# Patient Record
Sex: FEMALE | Race: WHITE | NOT HISPANIC OR LATINO | URBAN - METROPOLITAN AREA
[De-identification: names, ages, dates, MRNs, and addresses within clinical notes are randomized per-mention and may not be internally consistent; named-entity substitution may affect disease eponyms.]

---

## 2017-05-04 ENCOUNTER — OUTPATIENT (OUTPATIENT)
Dept: OUTPATIENT SERVICES | Facility: HOSPITAL | Age: 57
LOS: 1 days | Discharge: HOME | End: 2017-05-04

## 2017-06-28 DIAGNOSIS — Z01.419 ENCOUNTER FOR GYNECOLOGICAL EXAMINATION (GENERAL) (ROUTINE) WITHOUT ABNORMAL FINDINGS: ICD-10-CM

## 2018-12-18 ENCOUNTER — OUTPATIENT (OUTPATIENT)
Dept: OUTPATIENT SERVICES | Facility: HOSPITAL | Age: 58
LOS: 1 days | Discharge: HOME | End: 2018-12-18

## 2018-12-18 DIAGNOSIS — N20.0 CALCULUS OF KIDNEY: ICD-10-CM

## 2018-12-18 DIAGNOSIS — M54.5 LOW BACK PAIN: ICD-10-CM

## 2019-08-29 ENCOUNTER — TRANSCRIPTION ENCOUNTER (OUTPATIENT)
Age: 59
End: 2019-08-29

## 2020-01-04 ENCOUNTER — TRANSCRIPTION ENCOUNTER (OUTPATIENT)
Age: 60
End: 2020-01-04

## 2020-09-03 ENCOUNTER — APPOINTMENT (OUTPATIENT)
Dept: OBGYN | Facility: CLINIC | Age: 60
End: 2020-09-03
Payer: MEDICAID

## 2020-09-03 ENCOUNTER — LABORATORY RESULT (OUTPATIENT)
Age: 60
End: 2020-09-03

## 2020-09-03 VITALS
DIASTOLIC BLOOD PRESSURE: 74 MMHG | HEIGHT: 67 IN | BODY MASS INDEX: 17.58 KG/M2 | SYSTOLIC BLOOD PRESSURE: 102 MMHG | WEIGHT: 112 LBS

## 2020-09-03 DIAGNOSIS — Z01.419 ENCOUNTER FOR GYNECOLOGICAL EXAMINATION (GENERAL) (ROUTINE) W/OUT ABNORMAL FINDINGS: ICD-10-CM

## 2020-09-03 DIAGNOSIS — N32.81 OVERACTIVE BLADDER: ICD-10-CM

## 2020-09-03 PROBLEM — Z00.00 ENCOUNTER FOR PREVENTIVE HEALTH EXAMINATION: Status: ACTIVE | Noted: 2020-09-03

## 2020-09-03 PROCEDURE — 99386 PREV VISIT NEW AGE 40-64: CPT

## 2020-09-03 NOTE — PHYSICAL EXAM
[Awake] : awake [Alert] : alert [Acute Distress] : no acute distress [Mass] : no breast mass [Axillary LAD] : no axillary lymphadenopathy [Nipple Discharge] : no nipple discharge [Tender] : non tender [Soft] : soft [Oriented x3] : oriented to person, place, and time [Vulvar Atrophy] : vulvar atrophy [Atrophy] : atrophy [No Bleeding] : there was no active vaginal bleeding [Normal] : uterus [Uterine Adnexae] : were not tender and not enlarged

## 2020-09-14 LAB
BACTERIA UR CULT: NORMAL
HPV HIGH+LOW RISK DNA PNL CVX: NOT DETECTED

## 2020-09-17 DIAGNOSIS — N95.2 POSTMENOPAUSAL ATROPHIC VAGINITIS: ICD-10-CM

## 2020-09-17 RX ORDER — ESTRADIOL 0.1 MG/G
0.1 CREAM VAGINAL
Qty: 1 | Refills: 6 | Status: ACTIVE | COMMUNITY
Start: 2020-09-17 | End: 1900-01-01

## 2020-12-23 PROBLEM — Z01.419 ENCOUNTER FOR ANNUAL ROUTINE GYNECOLOGICAL EXAMINATION: Status: RESOLVED | Noted: 2020-09-03 | Resolved: 2020-12-23

## 2022-10-16 ENCOUNTER — EMERGENCY (EMERGENCY)
Facility: HOSPITAL | Age: 62
LOS: 0 days | Discharge: HOME | End: 2022-10-16
Attending: EMERGENCY MEDICINE | Admitting: EMERGENCY MEDICINE

## 2022-10-16 VITALS — TEMPERATURE: 98 F | DIASTOLIC BLOOD PRESSURE: 79 MMHG | SYSTOLIC BLOOD PRESSURE: 123 MMHG | HEART RATE: 90 BPM

## 2022-10-16 VITALS
OXYGEN SATURATION: 100 % | RESPIRATION RATE: 20 BRPM | DIASTOLIC BLOOD PRESSURE: 81 MMHG | TEMPERATURE: 99 F | SYSTOLIC BLOOD PRESSURE: 130 MMHG | HEART RATE: 105 BPM

## 2022-10-16 DIAGNOSIS — R42 DIZZINESS AND GIDDINESS: ICD-10-CM

## 2022-10-16 DIAGNOSIS — R53.1 WEAKNESS: ICD-10-CM

## 2022-10-16 DIAGNOSIS — Z20.822 CONTACT WITH AND (SUSPECTED) EXPOSURE TO COVID-19: ICD-10-CM

## 2022-10-16 DIAGNOSIS — E03.9 HYPOTHYROIDISM, UNSPECIFIED: ICD-10-CM

## 2022-10-16 LAB
ALBUMIN SERPL ELPH-MCNC: 4.5 G/DL — SIGNIFICANT CHANGE UP (ref 3.5–5.2)
ALP SERPL-CCNC: 64 U/L — SIGNIFICANT CHANGE UP (ref 30–115)
ALT FLD-CCNC: 15 U/L — SIGNIFICANT CHANGE UP (ref 0–41)
ANION GAP SERPL CALC-SCNC: 11 MMOL/L — SIGNIFICANT CHANGE UP (ref 7–14)
APPEARANCE UR: CLEAR — SIGNIFICANT CHANGE UP
AST SERPL-CCNC: 20 U/L — SIGNIFICANT CHANGE UP (ref 0–41)
BASOPHILS # BLD AUTO: 0.06 K/UL — SIGNIFICANT CHANGE UP (ref 0–0.2)
BASOPHILS NFR BLD AUTO: 1.3 % — HIGH (ref 0–1)
BILIRUB SERPL-MCNC: 0.3 MG/DL — SIGNIFICANT CHANGE UP (ref 0.2–1.2)
BILIRUB UR-MCNC: NEGATIVE — SIGNIFICANT CHANGE UP
BUN SERPL-MCNC: 9 MG/DL — LOW (ref 10–20)
CALCIUM SERPL-MCNC: 10 MG/DL — SIGNIFICANT CHANGE UP (ref 8.4–10.5)
CHLORIDE SERPL-SCNC: 101 MMOL/L — SIGNIFICANT CHANGE UP (ref 98–110)
CO2 SERPL-SCNC: 25 MMOL/L — SIGNIFICANT CHANGE UP (ref 17–32)
COLOR SPEC: YELLOW — SIGNIFICANT CHANGE UP
CREAT SERPL-MCNC: 0.7 MG/DL — SIGNIFICANT CHANGE UP (ref 0.7–1.5)
DIFF PNL FLD: NEGATIVE — SIGNIFICANT CHANGE UP
EGFR: 99 ML/MIN/1.73M2 — SIGNIFICANT CHANGE UP
EOSINOPHIL # BLD AUTO: 0.03 K/UL — SIGNIFICANT CHANGE UP (ref 0–0.7)
EOSINOPHIL NFR BLD AUTO: 0.6 % — SIGNIFICANT CHANGE UP (ref 0–8)
GLUCOSE SERPL-MCNC: 110 MG/DL — HIGH (ref 70–99)
GLUCOSE UR QL: NEGATIVE MG/DL — SIGNIFICANT CHANGE UP
HCT VFR BLD CALC: 41.1 % — SIGNIFICANT CHANGE UP (ref 37–47)
HGB BLD-MCNC: 13.8 G/DL — SIGNIFICANT CHANGE UP (ref 12–16)
IMM GRANULOCYTES NFR BLD AUTO: 0 % — LOW (ref 0.1–0.3)
KETONES UR-MCNC: NEGATIVE — SIGNIFICANT CHANGE UP
LEUKOCYTE ESTERASE UR-ACNC: NEGATIVE — SIGNIFICANT CHANGE UP
LIDOCAIN IGE QN: 28 U/L — SIGNIFICANT CHANGE UP (ref 7–60)
LYMPHOCYTES # BLD AUTO: 1.04 K/UL — LOW (ref 1.2–3.4)
LYMPHOCYTES # BLD AUTO: 22.2 % — SIGNIFICANT CHANGE UP (ref 20.5–51.1)
MAGNESIUM SERPL-MCNC: 2.2 MG/DL — SIGNIFICANT CHANGE UP (ref 1.8–2.4)
MCHC RBC-ENTMCNC: 29.6 PG — SIGNIFICANT CHANGE UP (ref 27–31)
MCHC RBC-ENTMCNC: 33.6 G/DL — SIGNIFICANT CHANGE UP (ref 32–37)
MCV RBC AUTO: 88.2 FL — SIGNIFICANT CHANGE UP (ref 81–99)
MONOCYTES # BLD AUTO: 0.23 K/UL — SIGNIFICANT CHANGE UP (ref 0.1–0.6)
MONOCYTES NFR BLD AUTO: 4.9 % — SIGNIFICANT CHANGE UP (ref 1.7–9.3)
NEUTROPHILS # BLD AUTO: 3.33 K/UL — SIGNIFICANT CHANGE UP (ref 1.4–6.5)
NEUTROPHILS NFR BLD AUTO: 71 % — SIGNIFICANT CHANGE UP (ref 42.2–75.2)
NITRITE UR-MCNC: NEGATIVE — SIGNIFICANT CHANGE UP
NRBC # BLD: 0 /100 WBCS — SIGNIFICANT CHANGE UP (ref 0–0)
PH UR: 7 — SIGNIFICANT CHANGE UP (ref 5–8)
PLATELET # BLD AUTO: 237 K/UL — SIGNIFICANT CHANGE UP (ref 130–400)
POTASSIUM SERPL-MCNC: 4.1 MMOL/L — SIGNIFICANT CHANGE UP (ref 3.5–5)
POTASSIUM SERPL-SCNC: 4.1 MMOL/L — SIGNIFICANT CHANGE UP (ref 3.5–5)
PROT SERPL-MCNC: 7 G/DL — SIGNIFICANT CHANGE UP (ref 6–8)
PROT UR-MCNC: NEGATIVE MG/DL — SIGNIFICANT CHANGE UP
RBC # BLD: 4.66 M/UL — SIGNIFICANT CHANGE UP (ref 4.2–5.4)
RBC # FLD: 13.2 % — SIGNIFICANT CHANGE UP (ref 11.5–14.5)
SARS-COV-2 RNA SPEC QL NAA+PROBE: SIGNIFICANT CHANGE UP
SODIUM SERPL-SCNC: 137 MMOL/L — SIGNIFICANT CHANGE UP (ref 135–146)
SP GR SPEC: 1.01 — SIGNIFICANT CHANGE UP (ref 1.01–1.03)
TROPONIN T SERPL-MCNC: <0.01 NG/ML — SIGNIFICANT CHANGE UP
UROBILINOGEN FLD QL: 0.2 MG/DL — SIGNIFICANT CHANGE UP
WBC # BLD: 4.69 K/UL — LOW (ref 4.8–10.8)
WBC # FLD AUTO: 4.69 K/UL — LOW (ref 4.8–10.8)

## 2022-10-16 PROCEDURE — 99285 EMERGENCY DEPT VISIT HI MDM: CPT

## 2022-10-16 PROCEDURE — 71045 X-RAY EXAM CHEST 1 VIEW: CPT | Mod: 26

## 2022-10-16 PROCEDURE — 93010 ELECTROCARDIOGRAM REPORT: CPT

## 2022-10-16 RX ORDER — LEVOTHYROXINE SODIUM 125 MCG
0 TABLET ORAL
Qty: 0 | Refills: 0 | DISCHARGE

## 2022-10-16 RX ORDER — MECLIZINE HCL 12.5 MG
1 TABLET ORAL
Qty: 21 | Refills: 0
Start: 2022-10-16 | End: 2022-10-22

## 2022-10-16 RX ORDER — SODIUM CHLORIDE 9 MG/ML
1000 INJECTION INTRAMUSCULAR; INTRAVENOUS; SUBCUTANEOUS ONCE
Refills: 0 | Status: COMPLETED | OUTPATIENT
Start: 2022-10-16 | End: 2022-10-16

## 2022-10-16 RX ORDER — MECLIZINE HCL 12.5 MG
50 TABLET ORAL ONCE
Refills: 0 | Status: COMPLETED | OUTPATIENT
Start: 2022-10-16 | End: 2022-10-16

## 2022-10-16 RX ADMIN — Medication 50 MILLIGRAM(S): at 14:03

## 2022-10-16 RX ADMIN — SODIUM CHLORIDE 1000 MILLILITER(S): 9 INJECTION INTRAMUSCULAR; INTRAVENOUS; SUBCUTANEOUS at 14:24

## 2022-10-16 NOTE — ED PROVIDER NOTE - NSFOLLOWUPINSTRUCTIONS_ED_ALL_ED_FT
Follow up with PMD and ENT in 1-2 days.    Dizziness    Dizziness can manifest as a feeling of unsteadiness or light-headedness. You may feel like you are about to faint. This condition can be caused by a number of things, including medicines, dehydration, or illness. Drink enough fluid to keep your urine clear or pale yellow. Do not drink alcohol and limit your caffeine intake. Avoid quick or sudden movements.  Rise slowly from chairs and steady yourself until you feel okay. In the morning, first sit up on the side of the bed.    SEEK IMMEDIATE MEDICAL CARE IF YOU HAVE ANY OF THE FOLLOWING SYMPTOMS: vomiting, changes in your vision or speech, weakness in your arms or legs, trouble speaking or swallowing, chest pain, abdominal pain, shortness of breath, sweating, bleeding, headache, neck pain, or fever.

## 2022-10-16 NOTE — ED ADULT NURSE NOTE - SUICIDE SCREENING QUESTION 3
Arterial Line      Patient location during procedure: OR   Line placed for hemodynamic monitoring.  Performed By   Anesthesiologist: Abraham Caraballo MD  Preanesthetic Checklist  Completed: patient identified, risks and benefits discussed and monitors and equipment checked  Arterial Line Prep   Sterile Tech: gloves, mask and cap  Prep: ChloraPrep  Patient monitoring: blood pressure monitoring, continuous pulse oximetry and EKG  Arterial Line Procedure   Laterality:right  Location:  radial artery  Catheter size: 20 G   Guidance: palpation technique  Number of attempts: 1  Successful placement: yes  Post Assessment   Dressing Type: occlusive dressing applied.   Complications no  Patient Tolerance: patient tolerated the procedure well with no apparent complications             No

## 2022-10-16 NOTE — ED PROVIDER NOTE - PATIENT PORTAL LINK FT
You can access the FollowMyHealth Patient Portal offered by [facility] by registering at the following website: [email or web address]. By joining [employer]’s FollowMyHealth portal, you will also be able to view your health information using other applications (apps) compatible with our system.

## 2022-10-16 NOTE — ED PROVIDER NOTE - OBJECTIVE STATEMENT
62y F pmh Hypothyroid presents for eval of weakness. Pt states she has generalized weakness since this morning, aggravated with going from lying to standing position, relieved at rest. Associated dizziness, aggravated with head movement. Denies fever, ha, cp, sob, numbness, dysuria

## 2022-10-16 NOTE — ED PROVIDER NOTE - PHYSICAL EXAMINATION
CONST: NAD  EYES: L lateral nystagmus. PERRL, EOMI, Sclera and conjunctiva clear.   ENT: No nasal discharge. Oropharynx normal appearing, no erythema or exudates. No abscess or swelling. Uvula midline.   NECK: Non-tender, no meningeal signs. normal ROM. supple   CARD: S1 S2; No jvd  RESP: Equal BS B/L, No wheezes, rhonchi or rales. No distress  GI: Soft, non-tender, non-distended. no cva tenderness. normal BS  MS: Normal ROM in all extremities. pulses 2 +. no calf tenderness or swelling  SKIN: Warm, dry, no acute rashes. Good turgor  NEURO: A&Ox3, No focal deficits. Strength 5/5 with no sensory deficits. Steady gait. Finger to nose intact. Negative pronator drift

## 2022-10-16 NOTE — ED PROVIDER NOTE - CARE PROVIDER_API CALL
James Armenta)  Otolaryngology  [address] [place] [address], [address] [place] [address]  Phone: [phone]  Fax: [phone]  Follow Up Time:

## 2022-10-16 NOTE — ED PROVIDER NOTE - CLINICAL SUMMARY MEDICAL DECISION MAKING FREE TEXT BOX
Patient presented with acute onset of generalized weakness and dizziness since this AM. Otherwise afebrile, HD stable, fully neurovascularly intact. EKG obtained and non-ischemic. Obtained labs which were grossly unremarkable including no significant leukocytosis, anemia, signs of dehydration/ALBER, transaminitis or significant electrolyte abnormalities. Trop negative. UA negative for infection. Chest xray negative for pneumothorax, pneumonia, widened mediastinum, evidence of rib fractures, enlarged cardiac silhouette or any other emergent pathologies. Patient's symptoms resolved after tx in ED after which time patient able to ambulate, tolerates PO. Given the above, will discharge home with outpatient follow up. Patient agreeable with plan. Agrees to return to ED for any new or worsening symptoms.

## 2022-10-16 NOTE — ED PROVIDER NOTE - NS ED ROS FT
Constitutional: (+) weakness, (-) fever  Eyes/ENT: (-) blurry vision, (-) epistaxis  Cardiovascular: (-) chest pain, (-) syncope  Respiratory: (-) cough, (-) shortness of breath  Gastrointestinal: (-) vomiting, (-) diarrhea  Gu: (-) dysuria, (-) hematuria  Musculoskeletal: (-) neck pain, (-) back pain, (-) joint pain  Integumentary: (-) rash, (-) edema  Neurological: (+) dizziness, (-) headache, (-) altered mental status  Allergic/Immunologic: (-) pruritus

## 2022-10-18 LAB
CULTURE RESULTS: SIGNIFICANT CHANGE UP
SPECIMEN SOURCE: SIGNIFICANT CHANGE UP

## 2022-10-19 ENCOUNTER — INPATIENT (INPATIENT)
Facility: HOSPITAL | Age: 62
LOS: 1 days | Discharge: HOME | End: 2022-10-21
Attending: INTERNAL MEDICINE | Admitting: INTERNAL MEDICINE
Payer: MEDICAID

## 2022-10-19 VITALS
SYSTOLIC BLOOD PRESSURE: 141 MMHG | OXYGEN SATURATION: 99 % | TEMPERATURE: 97 F | DIASTOLIC BLOOD PRESSURE: 76 MMHG | HEART RATE: 74 BPM | RESPIRATION RATE: 20 BRPM | WEIGHT: 127.21 LBS

## 2022-10-19 PROBLEM — E03.9 HYPOTHYROIDISM, UNSPECIFIED: Chronic | Status: ACTIVE | Noted: 2022-10-16

## 2022-10-19 LAB
ALBUMIN SERPL ELPH-MCNC: 4.4 G/DL — SIGNIFICANT CHANGE UP (ref 3.5–5.2)
ALP SERPL-CCNC: 54 U/L — SIGNIFICANT CHANGE UP (ref 30–115)
ALT FLD-CCNC: 14 U/L — SIGNIFICANT CHANGE UP (ref 0–41)
ANION GAP SERPL CALC-SCNC: 8 MMOL/L — SIGNIFICANT CHANGE UP (ref 7–14)
APPEARANCE UR: CLEAR — SIGNIFICANT CHANGE UP
AST SERPL-CCNC: 18 U/L — SIGNIFICANT CHANGE UP (ref 0–41)
BASOPHILS # BLD AUTO: 0.04 K/UL — SIGNIFICANT CHANGE UP (ref 0–0.2)
BASOPHILS NFR BLD AUTO: 0.8 % — SIGNIFICANT CHANGE UP (ref 0–1)
BILIRUB SERPL-MCNC: 0.3 MG/DL — SIGNIFICANT CHANGE UP (ref 0.2–1.2)
BILIRUB UR-MCNC: NEGATIVE — SIGNIFICANT CHANGE UP
BUN SERPL-MCNC: 12 MG/DL — SIGNIFICANT CHANGE UP (ref 10–20)
CALCIUM SERPL-MCNC: 9.5 MG/DL — SIGNIFICANT CHANGE UP (ref 8.4–10.5)
CHLORIDE SERPL-SCNC: 100 MMOL/L — SIGNIFICANT CHANGE UP (ref 98–110)
CO2 SERPL-SCNC: 28 MMOL/L — SIGNIFICANT CHANGE UP (ref 17–32)
COLOR SPEC: COLORLESS — SIGNIFICANT CHANGE UP
CREAT SERPL-MCNC: 0.6 MG/DL — LOW (ref 0.7–1.5)
DIFF PNL FLD: NEGATIVE — SIGNIFICANT CHANGE UP
EGFR: 103 ML/MIN/1.73M2 — SIGNIFICANT CHANGE UP
EOSINOPHIL # BLD AUTO: 0.05 K/UL — SIGNIFICANT CHANGE UP (ref 0–0.7)
EOSINOPHIL NFR BLD AUTO: 1 % — SIGNIFICANT CHANGE UP (ref 0–8)
GLUCOSE SERPL-MCNC: 98 MG/DL — SIGNIFICANT CHANGE UP (ref 70–99)
GLUCOSE UR QL: NEGATIVE — SIGNIFICANT CHANGE UP
HCT VFR BLD CALC: 39.6 % — SIGNIFICANT CHANGE UP (ref 37–47)
HGB BLD-MCNC: 13.2 G/DL — SIGNIFICANT CHANGE UP (ref 12–16)
IMM GRANULOCYTES NFR BLD AUTO: 0.2 % — SIGNIFICANT CHANGE UP (ref 0.1–0.3)
KETONES UR-MCNC: SIGNIFICANT CHANGE UP
LEUKOCYTE ESTERASE UR-ACNC: NEGATIVE — SIGNIFICANT CHANGE UP
LYMPHOCYTES # BLD AUTO: 1.12 K/UL — LOW (ref 1.2–3.4)
LYMPHOCYTES # BLD AUTO: 23.2 % — SIGNIFICANT CHANGE UP (ref 20.5–51.1)
MAGNESIUM SERPL-MCNC: 2.1 MG/DL — SIGNIFICANT CHANGE UP (ref 1.8–2.4)
MCHC RBC-ENTMCNC: 29.7 PG — SIGNIFICANT CHANGE UP (ref 27–31)
MCHC RBC-ENTMCNC: 33.3 G/DL — SIGNIFICANT CHANGE UP (ref 32–37)
MCV RBC AUTO: 89 FL — SIGNIFICANT CHANGE UP (ref 81–99)
MONOCYTES # BLD AUTO: 0.31 K/UL — SIGNIFICANT CHANGE UP (ref 0.1–0.6)
MONOCYTES NFR BLD AUTO: 6.4 % — SIGNIFICANT CHANGE UP (ref 1.7–9.3)
NEUTROPHILS # BLD AUTO: 3.3 K/UL — SIGNIFICANT CHANGE UP (ref 1.4–6.5)
NEUTROPHILS NFR BLD AUTO: 68.4 % — SIGNIFICANT CHANGE UP (ref 42.2–75.2)
NITRITE UR-MCNC: NEGATIVE — SIGNIFICANT CHANGE UP
NRBC # BLD: 0 /100 WBCS — SIGNIFICANT CHANGE UP (ref 0–0)
NT-PROBNP SERPL-SCNC: 76 PG/ML — SIGNIFICANT CHANGE UP (ref 0–300)
PH UR: 6 — SIGNIFICANT CHANGE UP (ref 5–8)
PLATELET # BLD AUTO: 218 K/UL — SIGNIFICANT CHANGE UP (ref 130–400)
POTASSIUM SERPL-MCNC: 4.1 MMOL/L — SIGNIFICANT CHANGE UP (ref 3.5–5)
POTASSIUM SERPL-SCNC: 4.1 MMOL/L — SIGNIFICANT CHANGE UP (ref 3.5–5)
PROT SERPL-MCNC: 6.6 G/DL — SIGNIFICANT CHANGE UP (ref 6–8)
PROT UR-MCNC: NEGATIVE — SIGNIFICANT CHANGE UP
RBC # BLD: 4.45 M/UL — SIGNIFICANT CHANGE UP (ref 4.2–5.4)
RBC # FLD: 13.2 % — SIGNIFICANT CHANGE UP (ref 11.5–14.5)
SARS-COV-2 RNA SPEC QL NAA+PROBE: SIGNIFICANT CHANGE UP
SODIUM SERPL-SCNC: 136 MMOL/L — SIGNIFICANT CHANGE UP (ref 135–146)
SP GR SPEC: 1.01 — SIGNIFICANT CHANGE UP (ref 1.01–1.03)
TROPONIN T SERPL-MCNC: <0.01 NG/ML — SIGNIFICANT CHANGE UP
TROPONIN T SERPL-MCNC: <0.01 NG/ML — SIGNIFICANT CHANGE UP
UROBILINOGEN FLD QL: SIGNIFICANT CHANGE UP
WBC # BLD: 4.83 K/UL — SIGNIFICANT CHANGE UP (ref 4.8–10.8)
WBC # FLD AUTO: 4.83 K/UL — SIGNIFICANT CHANGE UP (ref 4.8–10.8)

## 2022-10-19 PROCEDURE — 70450 CT HEAD/BRAIN W/O DYE: CPT | Mod: 26,MA

## 2022-10-19 PROCEDURE — 93010 ELECTROCARDIOGRAM REPORT: CPT

## 2022-10-19 PROCEDURE — 99220: CPT

## 2022-10-19 PROCEDURE — 71045 X-RAY EXAM CHEST 1 VIEW: CPT | Mod: 26

## 2022-10-19 RX ORDER — SODIUM CHLORIDE 9 MG/ML
1000 INJECTION INTRAMUSCULAR; INTRAVENOUS; SUBCUTANEOUS ONCE
Refills: 0 | Status: COMPLETED | OUTPATIENT
Start: 2022-10-19 | End: 2022-10-19

## 2022-10-19 RX ORDER — ACETAMINOPHEN 500 MG
650 TABLET ORAL ONCE
Refills: 0 | Status: COMPLETED | OUTPATIENT
Start: 2022-10-19 | End: 2022-10-19

## 2022-10-19 RX ORDER — METOCLOPRAMIDE HCL 10 MG
10 TABLET ORAL ONCE
Refills: 0 | Status: COMPLETED | OUTPATIENT
Start: 2022-10-19 | End: 2022-10-19

## 2022-10-19 RX ADMIN — SODIUM CHLORIDE 2000 MILLILITER(S): 9 INJECTION INTRAMUSCULAR; INTRAVENOUS; SUBCUTANEOUS at 11:02

## 2022-10-19 RX ADMIN — Medication 650 MILLIGRAM(S): at 11:35

## 2022-10-19 RX ADMIN — Medication 104 MILLIGRAM(S): at 11:02

## 2022-10-19 NOTE — ED PROVIDER NOTE - NS ED ROS FT
Constitutional: Negative for fever, chills, and fatigue.  HENT: + HA.  Eyes: Negative for eye pain, and vision change.  Cardiovascular: Negative for chest pain, and palpitation.  Respiratory: Negative for SOB, wheezing, cough and sputum production.  Gastrointestinal: Negative for nausea, vomiting, abdominal pain, constipation, diarrhea, hematochezia, and melena.  Genitourinary: Negative for flank pain, dysuria, frequency, and hematuria.  Neurological: + syncope. Negative for dizziness, focal weakness, numbness, and loss of consciousness.  Musculoskeletal: Negative for joint swelling, arthralgias, back pain, neck pain, and calf cramps.  Hematological: Does not bruise/bleed easily.

## 2022-10-19 NOTE — ED ADULT NURSE NOTE - SUICIDE SCREENING QUESTION 2
EKG interpretation:  Normal sinus rhythm, rate 78, no acute ST changes or T wave inversions. Left axis deviation. QTc within acceptable limits. No priors available for comparison.      Sarath Moreno MD  12/26/20 8645 No

## 2022-10-19 NOTE — ED PROVIDER NOTE - PROGRESS NOTE DETAILS
Labs and CT unremarkable. EKG unremarkable. Pt will be placed to OBS for further workup. Pt is aware of the plan and agree. Pt has been endorsed to OBS PA.

## 2022-10-19 NOTE — ED PROVIDER NOTE - PHYSICAL EXAMINATION
CONSTITUTIONAL: in no apparent distress.   HEAD: Normocephalic; atraumatic.   EYES: Pupils are round and reactive, extra-ocular muscles are intact. Eyelids are normal in appearance without swelling or lesions.   ENT: Hearing is intact with good acuity to spoken voice.  Patient is speaking clearly, not muffled and airway is intact.   RESPIRATORY: No signs of respiratory distress. Lung sounds are clear in all lobes bilaterally without rales, rhonchi, or wheezes.  CARDIOVASCULAR: Regular rate and rhythm.   GI: Abdomen is soft, non-tender, and without distention. Bowel sounds are present and normoactive in all four quadrants. No masses are noted.   NEURO: A & O x 3. Normal speech. Visual fields are full to confrontation. Pupils are equally reactive to light. Extraocular movement is intact. There is no eye deviation. Facial sensation is intact to light touch. Face is symmetric with normal eye closure and smile. Hearing is normal to spoken voice. Phonation is normal. No upper or lower extremity drift.  PSYCHOLOGICAL: Appropriate mood and affect. Good judgement and insight.

## 2022-10-19 NOTE — ED CDU PROVIDER INITIAL DAY NOTE - MEDICAL DECISION MAKING DETAILS
62yoF placed in obs for syncope eval. pending testing in AM, pt with dizziness and HR to 150s, sinus tachy on tele, ekg with mild STD laterally, trop neg. ddimer sent and elevated, CTPE neg. given symptomatic during sig tachycardia, admitted to tele for further w/up. BP stable, heart reg no mrg, ctab.

## 2022-10-19 NOTE — ED PROVIDER NOTE - ATTENDING APP SHARED VISIT CONTRIBUTION OF CARE
62-year-old female with past medical history of hypothyroidism, presents with headache and syncope.  Patient had a syncopal episode today, witnessed by , lasted for about 1 minute.  Patient had no convulsions or loss of bladder or bowel function.  As per  patient has been complaining of headache and decided to jump to bed and when he came to rub her head she rested her head on his chest and had LOC for about 1 minute.  Patient admits she was seen at [facility] ED a few days ago for headache and lightheadedness.  Patient had labs done and was prescribed meclizine.  As per family meclizine helped a bit but patient continued to have headache and felt lightheaded.  Patient denies any chest pain or shortness of breath.  Patient denies any focal numbness, weakness, facial droop, slurred speech, or vision changes.  Patient just says she feels tired now.  Patient denies nausea vomiting diarrhea or abdominal pain.  Never had a stress test or seen cardiology.  Exam: nad, ncat, perrl, eomi, mmm, rrr, ctab, abd soft, nt, nd aox3, cn2-12 normal, 5/5 strength all ext, sensation intact, finger to nose normal, romberg neg, prontator drift neg, impression: Patient with syncopal episode today, has been having headache for few days, unrelieved by Tylenol, will check EKG, CT, labs and likely syncope

## 2022-10-19 NOTE — ED ADULT NURSE NOTE - CHIEF COMPLAINT QUOTE
patient reports headache this am with syncopal episode - patient seen in [facility] ed for dizziness placed on meclizine. f/s 140 in field

## 2022-10-19 NOTE — ED ADULT NURSE REASSESSMENT NOTE - NS ED NURSE REASSESS COMMENT FT1
Assessed patient on arrival, is A+Ox4 and ambulatory. Pt is no longer feeling dizzy. V/S WNL and on cardiac monitor.

## 2022-10-19 NOTE — ED CDU PROVIDER INITIAL DAY NOTE - NEURO NEGATIVE STATEMENT, MLM
+ lightheadedness, + headache ; + loss of consciousness, no gait abnormality, no sensory deficits, and no weakness.

## 2022-10-19 NOTE — ED ADULT TRIAGE NOTE - CHIEF COMPLAINT QUOTE
patient reports headache this am with syncopal episode - patient seen in [facility] ed for dizziness placed on meclizine. f/s 140 in field

## 2022-10-19 NOTE — ED CDU PROVIDER INITIAL DAY NOTE - OBJECTIVE STATEMENT
61 y/o F, PMHx Hypothyroidism, presents to the ED s/p syncopal episode this AM. Patient explains that she has been feeling lightheaded intermittently for the past several days. She went to [facility] ED when symptoms began and was discharged to home with meclizine which she has been taking with some symptomatic improvement. This AM however she reports having a flushed sensation while sitting on the edge of her bed. She informed her partner that she felt as though she was going to pass out and believes she did for several seconds. She admits to a mild accompanying headache ; denies visual changes, weakness, recent illness, fever, chills, chest pain, dyspnea, back pain and abdominal pain.

## 2022-10-19 NOTE — ED PROVIDER NOTE - CLINICAL SUMMARY MEDICAL DECISION MAKING FREE TEXT BOX
Patient with syncopal episode today, has been having headache for few days, unrelieved by Tylenol, will check EKG, CT, labs and likely syncope

## 2022-10-19 NOTE — ED PROVIDER NOTE - OBJECTIVE STATEMENT
63 y/o F, PMHx Hypothyroidism, presents to the ED s/p syncopal episode this AM. Patient explains that she has been feeling lightheaded intermittently for the past several days. She went to [facility] ED when symptoms began and was discharged to home with meclizine which she has been taking with some symptomatic improvement. This AM however she reports having a flushed sensation while sitting on the edge of her bed. She informed her partner that she felt as though she was going to pass out and believes she did for several seconds. She admits to a mild accompanying headache ; denies visual changes, weakness, recent illness, fever, chills, chest pain, dyspnea, back pain and abdominal pain. 62-year-old female with a past medical history of hypothyroidism who presents with syncope episode.  Reports that she was having dizziness and headache few days ago and was seen at [facility]; labs were done and meclizine was given and her symptoms improved significantly; patient was discharged.  Reports that she was still having some intermittent dizziness, but is controlled by meclizine.  Reports that she was coming out of the shower this morning and felt lightheaded, so she sat on the edge of the bed.  Per fiancé, patient resting her head on his chest and was not interacting with him, but shortly was unresponsive for several seconds.  Patient denies fever, shortness of breath, chest pain, nausea, vomiting, abdominal pain, urinary symptoms, and change in bowel movement. Denies recent head injury and trauma.

## 2022-10-19 NOTE — ED PROVIDER NOTE - NEURO NEGATIVE STATEMENT, MLM
+ lightheadedness; + headache; + loss of consciousness, no gait abnormality, no sensory deficits, and no weakness.

## 2022-10-19 NOTE — ED ADULT NURSE NOTE - OBJECTIVE STATEMENT
Pt presents with c/o headache and dizziness. Denies fever, chills, chest pain, SOB, nausea/vomiting/diarrhea.

## 2022-10-20 LAB — D DIMER BLD IA.RAPID-MCNC: 588 NG/ML DDU — HIGH (ref 0–230)

## 2022-10-20 PROCEDURE — 99223 1ST HOSP IP/OBS HIGH 75: CPT

## 2022-10-20 PROCEDURE — 93010 ELECTROCARDIOGRAM REPORT: CPT

## 2022-10-20 PROCEDURE — 71275 CT ANGIOGRAPHY CHEST: CPT | Mod: 26,MA

## 2022-10-20 PROCEDURE — 93306 TTE W/DOPPLER COMPLETE: CPT | Mod: 26

## 2022-10-20 RX ORDER — LANOLIN ALCOHOL/MO/W.PET/CERES
5 CREAM (GRAM) TOPICAL ONCE
Refills: 0 | Status: COMPLETED | OUTPATIENT
Start: 2022-10-20 | End: 2022-10-20

## 2022-10-20 RX ORDER — MECLIZINE HCL 12.5 MG
25 TABLET ORAL THREE TIMES A DAY
Refills: 0 | Status: DISCONTINUED | OUTPATIENT
Start: 2022-10-20 | End: 2022-10-21

## 2022-10-20 RX ORDER — LEVOTHYROXINE SODIUM 125 MCG
25 TABLET ORAL DAILY
Refills: 0 | Status: DISCONTINUED | OUTPATIENT
Start: 2022-10-20 | End: 2022-10-21

## 2022-10-20 RX ORDER — ENOXAPARIN SODIUM 100 MG/ML
40 INJECTION SUBCUTANEOUS EVERY 24 HOURS
Refills: 0 | Status: DISCONTINUED | OUTPATIENT
Start: 2022-10-20 | End: 2022-10-21

## 2022-10-20 RX ORDER — HYDROXYZINE HCL 10 MG
25 TABLET ORAL ONCE
Refills: 0 | Status: COMPLETED | OUTPATIENT
Start: 2022-10-20 | End: 2022-10-20

## 2022-10-20 RX ADMIN — Medication 5 MILLIGRAM(S): at 02:38

## 2022-10-20 RX ADMIN — Medication 25 MILLIGRAM(S): at 10:46

## 2022-10-20 RX ADMIN — Medication 25 MILLIGRAM(S): at 20:20

## 2022-10-20 NOTE — H&P ADULT - NSHPLABSRESULTS_GEN_ALL_CORE
(10-19 @ 10:55)                      13.2  4.83 )-----------( 218                 39.6    Neutrophils = 3.30 (68.4%)  Lymphocytes = 1.12 (23.2%)  Eosinophils = 0.05 (1.0%)  Basophils = 0.04 (0.8%)  Monocytes = 0.31 (6.4%)  Bands = --%    10-19    136  |  100  |  12  ----------------------------<  98  4.1   |  28  |  0.6<L>    Ca    9.5      19 Oct 2022 10:55  Mg     2.1     10-19    TPro  6.6  /  Alb  4.4  /  TBili  0.3  /  DBili  x   /  AST  18  /  ALT  14  /  AlkPhos  54  10      CARDIAC MARKERS ( 19 Oct 2022 15:25 )  Trop <0.01 ng/mL / CK x     / CKMB x       CARDIAC MARKERS ( 19 Oct 2022 10:55 )  Trop <0.01 ng/mL / CK x     / CKMB x           RVP:          Tox:         Urinalysis Basic - ( 19 Oct 2022 14:09 )    Color: Colorless / Appearance: Clear / S.008 / pH: x  Gluc: x / Ketone: Trace  / Bili: Negative / Urobili: <2 mg/dL   Blood: x / Protein: Negative / Nitrite: Negative   Leuk Esterase: Negative / RBC: x / WBC x   Sq Epi: x / Non Sq Epi: x / Bacteria: x

## 2022-10-20 NOTE — ED CDU PROVIDER SUBSEQUENT DAY NOTE - PROGRESS NOTE DETAILS
Patient resting comfortably, denies complaints at this time. Patient with episode of tachycardia. D-dimer was  elevated. CT PE study ordered. CT neg for PE, will admit. Dr Staples aware CT neg for PE, will admit. Dr Staples aware. Patient requesting Dr Wilson for cards

## 2022-10-20 NOTE — H&P ADULT - TIME BILLING
time spent evaluating and treating the patient's acute illness as well as time spent reviewing labs, radiology, discussing  with patient and/or patient's family, and discussing the case with a multidisciplinary team.

## 2022-10-20 NOTE — H&P ADULT - NSHPPHYSICALEXAM_GEN_ALL_CORE
LOS:     VITALS:   T(C): 36.7 (10-20-22 @ 16:43), Max: 36.7 (10-20-22 @ 16:43)  HR: 84 (10-20-22 @ 16:43) (75 - 150)  BP: 109/65 (10-20-22 @ 16:43) (109/65 - 137/75)  RR: 17 (10-20-22 @ 16:43) (17 - 18)  SpO2: 97% (10-20-22 @ 16:43) (97% - 98%)    GENERAL: NAD, lying in bed comfortably  HEAD:  Atraumatic, Normocephalic  EYES: EOMI, PERRLA, conjunctiva and sclera clear  ENT: Moist mucous membranes  NECK: Supple, No JVD  CHEST/LUNG: Clear to auscultation bilaterally; No rales, rhonchi, wheezing, or rubs. Unlabored respirations  HEART: Regular rate and rhythm; No murmurs, rubs, or gallops  ABDOMEN: BSx4; Soft, nontender, nondistended  EXTREMITIES:  2+ Peripheral Pulses, brisk capillary refill. No clubbing, cyanosis, or edema  NERVOUS SYSTEM:  A&Ox3, no focal deficits   SKIN: No rashes or lesions

## 2022-10-20 NOTE — ED ADULT NURSE REASSESSMENT NOTE - NS ED NURSE REASSESS COMMENT FT1
pt had an episode of feeling faint, HR noted @ 150's lasted for approx 1-2mins. pt is alert and oriented, Dr. Virgen and PA called at bedside. EKG done. sent blood sample for D-Dimer. HR now down to 108bpm, /75.

## 2022-10-20 NOTE — H&P ADULT - NSHPREVIEWOFSYSTEMS_GEN_ALL_CORE
per HPI Review of Systems:  •	CONSTITUTIONAL - No fever, No diaphoresis, No weight change  •	SKIN - No rash  •	HEMATOLOGIC - No abnormal bleeding or bruising  •	EYES - No eye pain, No blurred vision  •	ENT - No change in hearing, No sore throat, No neck pain, No rhinorrhea, No ear pain  •	RESPIRATORY - No shortness of breath, No cough  •	CARDIAC -No chest pain, No palpitations  •	GI - No abdominal pain, No nausea, No vomiting, No diarrhea, No constipation, No bright red blood per rectum or melena. No flank pain  •                 - No dysuria, frequency, hematuria.   •	ENDO - No polydypsia, No polyuria, No heat/cold intolerance  •	MUSCULOSKELETAL - No joint paint, No swelling, No back pain  •	NEUROLOGIC - No numbness, No focal weakness,  headache, No dizziness  All other systems negative, unless specified in HPI

## 2022-10-20 NOTE — H&P ADULT - ATTENDING COMMENTS
62-year-old female with past medical history of hypothyroidism, presents with headache, dizziness, and syncope.  Patient had a syncopal episode today, witnessed by , lasted for about 1 minute.  Patient had no convulsions or loss of bladder or bowel function.  As per  patient has been complaining of headache and decided to jump to bed and when he came to rub her head she rested her head on his chest and had LOC for about 1 minute.  Patient admits she was seen at [facility] ED a few days ago for headache and lightheadedness.  Patient had labs done and was prescribed meclizine.  As per family meclizine helped a bit but patient continued to have headache and felt lightheaded.  Patient denies any chest pain or shortness of breath.  Patient denies any focal numbness, weakness, facial droop, slurred speech, or vision changes.  Patient just says she feels tired now.  Patient denies nausea vomiting diarrhea or abdominal pain.  Never had a stress test or seen cardiology.    In ED, she was HD stable. labs showed no major abnormalities. CT head negative. TTE G1 diastolic dysfx. Trop x2 negative. CTA lung no PE. Patient admitted to Telemetry floor    Summary:   1. Left ventricular ejection fraction, by visual estimation, is 55 to   60%.   2. Spectral Doppler shows impaired relaxation pattern of left   ventricular myocardial filling (Grade I diastolic dysfunction).   3. Normal left atrial size.   4. Normal right atrial size.   5. No evidence of mitral valve regurgitation.   6. Mild tricuspid regurgitation.      IMPRESSION  Syncopal Episode Etiologies Include neuro mediated, orthostatic or cardiovascular  > Hemodynamically Stable , CTA neg for PE, CT no evidence of Acute intracranial Pathology   > Less likely to be  Seizure Episode   > ECG, NSR no Ischemic Changes  noted   Hx Hypothyroidism       Plan  Admit  to tele  Echo Noted   Check Orthostatics   Fall Precautions  Monitor electrolytes Correct as needed   f/u TSH and T4  levels  f/u - Cardio, Neuro, ENT  Consult  Doppler carotid  EEG 62-year-old female with past medical history of hypothyroidism, presents with headache, dizziness, and syncope.  Patient had a syncopal episode today, witnessed by , lasted for about 1 minute.  Patient had no convulsions or loss of bladder or bowel function.  As per  patient has been complaining of headache and decided to jump to bed and when he came to rub her head she rested her head on his chest and had LOC for about 1 minute.  Patient admits she was seen at [facility] ED a few days ago for headache and lightheadedness.  Patient had labs done and was prescribed meclizine.  As per family meclizine helped a bit but patient continued to have headache and felt lightheaded.  Patient denies any chest pain or shortness of breath.  Patient denies any focal numbness, weakness, facial droop, slurred speech, or vision changes.  Patient just says she feels tired now.  Patient denies nausea vomiting diarrhea or abdominal pain.  Never had a stress test or seen cardiology.    In ED, she was HD stable. labs showed no major abnormalities. CT head negative. TTE G1 diastolic dysfx. Trop x2 negative. CTA lung no PE. Patient admitted to Telemetry floor    Summary:   1. Left ventricular ejection fraction, by visual estimation, is 55 to   60%.   2. Spectral Doppler shows impaired relaxation pattern of left   ventricular myocardial filling (Grade I diastolic dysfunction).   3. Normal left atrial size.   4. Normal right atrial size.   5. No evidence of mitral valve regurgitation.   6. Mild tricuspid regurgitation.    VITAL SIGNS: AFebrile, vital signs stable  CONSTITUTIONAL: Well-developed; well-nourished; in no acute distress.  SKIN: Skin exam is warm and dry, no acute rash.  HEAD: Normocephalic; atraumatic.  EYES: Pupils equal round reactive to light, Extraocular movements intact; conjunctiva and sclera clear.  ENT: No nasal discharge; airway clear. Moist mucus membranes.  NECK: Supple; non tender. No rigidity  CARD: Regular rate and rhythm. Normal S1, S2; no murmurs, gallops, or rubs.  RESP: Lungs clear to auscultation bilaterally. No wheezes, rales or rhonchi.  ABD: Abdomen soft; non-tender; non-distended;  no hepatosplenomegaly. No costovertebral angle tenderness.   EXT: Normal ROM. No clubbing, cyanosis or edema. No calf tenderness to palpation.  NEURO: Alert and oriented x 3. No focal deficits.  PSYCH: Cooperative, appropriate.       IMPRESSION  Syncopal Episode Etiologies Include neuro mediated, orthostatic or cardiovascular  > Hemodynamically Stable , CTA neg for PE, CT no evidence of Acute intracranial Pathology   > Less likely to be  Seizure Episode   > ECG, NSR no Ischemic Changes  noted   Hx Hypothyroidism       Plan  Admit  to tele  Echo Noted   Check Orthostatics   Fall Precautions  Monitor electrolytes Correct as needed   f/u TSH and T4  levels  f/u - Cardio, Neuro, ENT  Consult  Doppler carotid  EEG  If w/u Neg  may need loop  seen 10/20

## 2022-10-20 NOTE — H&P ADULT - HISTORY OF PRESENT ILLNESS
62-year-old female with past medical history of hypothyroidism, presents with headache, dizziness, and syncope.  Patient had a syncopal episode today, witnessed by , lasted for about 1 minute.  Patient had no convulsions or loss of bladder or bowel function.  As per  patient has been complaining of headache and decided to jump to bed and when he came to rub her head she rested her head on his chest and had LOC for about 1 minute.  Patient admits she was seen at [facility] ED a few days ago for headache and lightheadedness.  Patient had labs done and was prescribed meclizine.  As per family meclizine helped a bit but patient continued to have headache and felt lightheaded.  Patient denies any chest pain or shortness of breath.  Patient denies any focal numbness, weakness, facial droop, slurred speech, or vision changes.  Patient just says she feels tired now.  Patient denies nausea vomiting diarrhea or abdominal pain.  Never had a stress test or seen cardiology.    In ED, she was HD stable. labs showed no major abnormalities. CT head negative. TTE G1 diastolic dysfx. Trop x2 negative. CTA lung no PE. Patient admitted to Telemetry floor

## 2022-10-20 NOTE — H&P ADULT - ASSESSMENT
62-year-old female with past medical history of hypothyroidism, presents with headache, dizziness, and syncope.  Patient had a syncopal episode today, witnessed by , lasted for about 1 minute.  Patient had no convulsions or loss of bladder or bowel function.  As per  patient has been complaining of headache and decided to jump to bed and when he came to rub her head she rested her head on his chest and had LOC for about 1 minute.  Patient admits she was seen at [facility] ED a few days ago for headache and lightheadedness.  Patient had labs done and was prescribed meclizine.  As per family meclizine helped a bit but patient continued to have headache and felt lightheaded.  Patient denies any chest pain or shortness of breath.  Patient denies any focal numbness, weakness, facial droop, slurred speech, or vision changes.  Patient just says she feels tired now.  Patient denies nausea vomiting diarrhea or abdominal pain.  Never had a stress test or seen cardiology.    In ED, she was HD stable. labs showed no major abnormalities. CT head negative. TTE G1 diastolic dysfx. Trop x2 negative. CTA lung no PE. Patient admitted to Telemetry floor    # Syncope and Dizziness - r/o cardiac vs neurologic vs ENT causes  - Admit to Tele  - Trop x3 negative  - TTE normal EF. G1 DDfx. no valvular abnormlaities  - CT brain negative  - CTA chest no PE  - EKG sinus rhythm  - labs within normal limits  - Cardio consult  - Neuro Consult  - ENT consult  - Doppler carotid  - EEG  - meclizine PRN  - check orthostatics    # Hypothyroidism  - continue levothyroxine  - check TSH level    # DVT proph: lovenox  # GI proph: not needed  # Activity: as tolerated  # Diet: regular  # Dispo: acute. admit to Telemetry

## 2022-10-21 ENCOUNTER — TRANSCRIPTION ENCOUNTER (OUTPATIENT)
Age: 62
End: 2022-10-21

## 2022-10-21 VITALS
DIASTOLIC BLOOD PRESSURE: 63 MMHG | TEMPERATURE: 98 F | OXYGEN SATURATION: 99 % | RESPIRATION RATE: 18 BRPM | HEART RATE: 72 BPM | SYSTOLIC BLOOD PRESSURE: 129 MMHG

## 2022-10-21 LAB
-  AMIKACIN: SIGNIFICANT CHANGE UP
-  AMOXICILLIN/CLAVULANIC ACID: SIGNIFICANT CHANGE UP
-  AMPICILLIN/SULBACTAM: SIGNIFICANT CHANGE UP
-  AMPICILLIN: SIGNIFICANT CHANGE UP
-  AZTREONAM: SIGNIFICANT CHANGE UP
-  CEFAZOLIN: SIGNIFICANT CHANGE UP
-  CEFEPIME: SIGNIFICANT CHANGE UP
-  CEFOXITIN: SIGNIFICANT CHANGE UP
-  CEFTRIAXONE: SIGNIFICANT CHANGE UP
-  CIPROFLOXACIN: SIGNIFICANT CHANGE UP
-  ERTAPENEM: SIGNIFICANT CHANGE UP
-  GENTAMICIN: SIGNIFICANT CHANGE UP
-  IMIPENEM: SIGNIFICANT CHANGE UP
-  LEVOFLOXACIN: SIGNIFICANT CHANGE UP
-  MEROPENEM: SIGNIFICANT CHANGE UP
-  NITROFURANTOIN: SIGNIFICANT CHANGE UP
-  PIPERACILLIN/TAZOBACTAM: SIGNIFICANT CHANGE UP
-  TIGECYCLINE: SIGNIFICANT CHANGE UP
-  TOBRAMYCIN: SIGNIFICANT CHANGE UP
-  TRIMETHOPRIM/SULFAMETHOXAZOLE: SIGNIFICANT CHANGE UP
ALBUMIN SERPL ELPH-MCNC: 4.3 G/DL — SIGNIFICANT CHANGE UP (ref 3.5–5.2)
ALP SERPL-CCNC: 55 U/L — SIGNIFICANT CHANGE UP (ref 30–115)
ALT FLD-CCNC: 15 U/L — SIGNIFICANT CHANGE UP (ref 0–41)
ANION GAP SERPL CALC-SCNC: 8 MMOL/L — SIGNIFICANT CHANGE UP (ref 7–14)
AST SERPL-CCNC: 18 U/L — SIGNIFICANT CHANGE UP (ref 0–41)
BILIRUB SERPL-MCNC: 0.4 MG/DL — SIGNIFICANT CHANGE UP (ref 0.2–1.2)
BUN SERPL-MCNC: 8 MG/DL — LOW (ref 10–20)
CALCIUM SERPL-MCNC: 9.4 MG/DL — SIGNIFICANT CHANGE UP (ref 8.4–10.4)
CHLORIDE SERPL-SCNC: 103 MMOL/L — SIGNIFICANT CHANGE UP (ref 98–110)
CO2 SERPL-SCNC: 29 MMOL/L — SIGNIFICANT CHANGE UP (ref 17–32)
CREAT SERPL-MCNC: 0.6 MG/DL — LOW (ref 0.7–1.5)
CULTURE RESULTS: SIGNIFICANT CHANGE UP
EGFR: 103 ML/MIN/1.73M2 — SIGNIFICANT CHANGE UP
GLUCOSE BLDC GLUCOMTR-MCNC: 114 MG/DL — HIGH (ref 70–99)
GLUCOSE BLDC GLUCOMTR-MCNC: 97 MG/DL — SIGNIFICANT CHANGE UP (ref 70–99)
GLUCOSE SERPL-MCNC: 87 MG/DL — SIGNIFICANT CHANGE UP (ref 70–99)
HCT VFR BLD CALC: 37.9 % — SIGNIFICANT CHANGE UP (ref 37–47)
HCV AB S/CO SERPL IA: 0.04 COI — SIGNIFICANT CHANGE UP
HCV AB SERPL-IMP: SIGNIFICANT CHANGE UP
HGB BLD-MCNC: 12.9 G/DL — SIGNIFICANT CHANGE UP (ref 12–16)
MAGNESIUM SERPL-MCNC: 2.2 MG/DL — SIGNIFICANT CHANGE UP (ref 1.8–2.4)
MCHC RBC-ENTMCNC: 29.9 PG — SIGNIFICANT CHANGE UP (ref 27–31)
MCHC RBC-ENTMCNC: 34 G/DL — SIGNIFICANT CHANGE UP (ref 32–37)
MCV RBC AUTO: 87.9 FL — SIGNIFICANT CHANGE UP (ref 81–99)
METHOD TYPE: SIGNIFICANT CHANGE UP
NRBC # BLD: 0 /100 WBCS — SIGNIFICANT CHANGE UP (ref 0–0)
ORGANISM # SPEC MICROSCOPIC CNT: SIGNIFICANT CHANGE UP
ORGANISM # SPEC MICROSCOPIC CNT: SIGNIFICANT CHANGE UP
PLATELET # BLD AUTO: 247 K/UL — SIGNIFICANT CHANGE UP (ref 130–400)
POTASSIUM SERPL-MCNC: 4.4 MMOL/L — SIGNIFICANT CHANGE UP (ref 3.5–5)
POTASSIUM SERPL-SCNC: 4.4 MMOL/L — SIGNIFICANT CHANGE UP (ref 3.5–5)
PROT SERPL-MCNC: 6.4 G/DL — SIGNIFICANT CHANGE UP (ref 6–8)
RBC # BLD: 4.31 M/UL — SIGNIFICANT CHANGE UP (ref 4.2–5.4)
RBC # FLD: 13.3 % — SIGNIFICANT CHANGE UP (ref 11.5–14.5)
SODIUM SERPL-SCNC: 140 MMOL/L — SIGNIFICANT CHANGE UP (ref 135–146)
SPECIMEN SOURCE: SIGNIFICANT CHANGE UP
T4 AB SER-ACNC: 8.1 UG/DL — SIGNIFICANT CHANGE UP (ref 4.6–12)
TSH SERPL-MCNC: 6.13 UIU/ML — HIGH (ref 0.27–4.2)
TSH SERPL-MCNC: 6.39 UIU/ML — HIGH (ref 0.27–4.2)
WBC # BLD: 5.35 K/UL — SIGNIFICANT CHANGE UP (ref 4.8–10.8)
WBC # FLD AUTO: 5.35 K/UL — SIGNIFICANT CHANGE UP (ref 4.8–10.8)

## 2022-10-21 PROCEDURE — 93880 EXTRACRANIAL BILAT STUDY: CPT | Mod: 26

## 2022-10-21 PROCEDURE — 99221 1ST HOSP IP/OBS SF/LOW 40: CPT

## 2022-10-21 PROCEDURE — 99239 HOSP IP/OBS DSCHRG MGMT >30: CPT

## 2022-10-21 PROCEDURE — 93010 ELECTROCARDIOGRAM REPORT: CPT

## 2022-10-21 PROCEDURE — 95819 EEG AWAKE AND ASLEEP: CPT | Mod: 26

## 2022-10-21 RX ORDER — IBUPROFEN 200 MG
400 TABLET ORAL ONCE
Refills: 0 | Status: COMPLETED | OUTPATIENT
Start: 2022-10-21 | End: 2022-10-21

## 2022-10-21 RX ADMIN — Medication 400 MILLIGRAM(S): at 14:51

## 2022-10-21 RX ADMIN — ENOXAPARIN SODIUM 40 MILLIGRAM(S): 100 INJECTION SUBCUTANEOUS at 06:10

## 2022-10-21 RX ADMIN — Medication 25 MICROGRAM(S): at 06:10

## 2022-10-21 NOTE — PROGRESS NOTE ADULT - ASSESSMENT
Syncopal Episode Etiologies Include neuro mediated, orthostatic or cardiovascular  > Hemodynamically Stable , CTA neg for PE, CT no evidence of Acute intracranial Pathology   > Less likely to be  Seizure Episode --check EEG  > ECG, NSR no Ischemic Changes  noted   Hx Hypothyroidism     spoke with neurology-- will get EEG report today and no need for any further invasive study-- patient is taking alot of supplements from home and it was emphasized that she needs to stop it.  Dc home today--if EEG is negative. Syncopal Episode Etiologies Include neuro mediated, orthostatic or cardiovascular  > Hemodynamically Stable , CTA neg for PE, CT no evidence of Acute intracranial Pathology   > Less likely to be  Seizure Episode --check EEG  > ECG, NSR no Ischemic Changes  noted   Hx Hypothyroidism --TSH is mildly elevated-- T4 normal-- should repeat test in 4 weeks.    spoke with neurology-- will get EEG report today and no need for any further invasive study-- patient is taking alot of supplements from home and it was emphasized that she needs to stop it.  Dc home today--if EEG is negative.

## 2022-10-21 NOTE — DISCHARGE NOTE PROVIDER - NSDCCPCAREPLAN_GEN_ALL_CORE_FT
PRINCIPAL DISCHARGE DIAGNOSIS  Diagnosis: Syncope  Assessment and Plan of Treatment: You were admitted and managed here for the syncope. Your syncope is most likely due to the over the counter medications you are taking. Please avoid the  use of over the counter medications without your doctor prescription. We did certain lab and imaging tests for the evaluation of your syncope. No test reported any significant findings.   Syncope is when you temporarily lose consciousness, also called fainting or passing out. It is caused by a sudden decrease in blood flow to the brain. Even though most causes of syncope are not dangerous, syncope can possibly be a sign of a serious medical problem. Signs that you may be about to faint include feeling dizzy, lightheaded, nausea, visual changes, or cold/clammy skin. Do not drive, operate heavy machinery, or play sports until your health care provider says it is okay.  SEEK IMMEDIATE MEDICAL CARE IF YOU HAVE ANY OF THE FOLLOWING SYMPTOMS: severe headache, pain in your chest/abdomen/back, bleeding from your mouth or rectum, palpitations, shortness of breath, pain with breathing, seizure, confusion, or trouble walking.  Please take your medications as prescribed and follow the instructions given to you regarding your health. Keep follow up appointments with your doctors as instructed.      SECONDARY DISCHARGE DIAGNOSES  Diagnosis: Tachycardia  Assessment and Plan of Treatment:

## 2022-10-21 NOTE — PROGRESS NOTE ADULT - SUBJECTIVE AND OBJECTIVE BOX
SUBJECTIVE:    Patient is a 62y old Female who presents with a chief complaint of syncope (21 Oct 2022 06:59)    Currently admitted to medicine with the primary diagnosis of Syncope       Today is hospital day 1d.     PAST MEDICAL & SURGICAL HISTORY  Hypothyroid    No significant past surgical history      ALLERGIES:  No Known Allergies    MEDICATIONS:  STANDING MEDICATIONS  enoxaparin Injectable 40 milliGRAM(s) SubCutaneous every 24 hours  levothyroxine  Oral Tab/Cap - Peds 25 MICROGram(s) Oral daily    PRN MEDICATIONS  meclizine 25 milliGRAM(s) Oral three times a day PRN    VITALS:   T(F): 97.2  HR: 77  BP: 122/60  RR: 18  SpO2: 97%    LABS:                        12.9   5.35  )-----------( 247      ( 21 Oct 2022 06:27 )             37.9     10-21    140  |  103  |  8<L>  ----------------------------<  87  4.4   |  29  |  0.6<L>    Ca    9.4      21 Oct 2022 06:27  Mg     2.2     10-21    TPro  6.4  /  Alb  4.3  /  TBili  0.4  /  DBili  x   /  AST  18  /  ALT  15  /  AlkPhos  55  10-21              Culture - Urine (collected 19 Oct 2022 14:09)  Source: Clean Catch Clean Catch (Midstream)  Preliminary Report (20 Oct 2022 19:07):    10,000 - 49,000 CFU/mL Escherichia coli      CARDIAC MARKERS ( 19 Oct 2022 15:25 )  x     / <0.01 ng/mL / x     / x     / x          RADIOLOGY:    PHYSICAL EXAM:  GEN: No acute distress  LUNGS: Clear to auscultation bilaterally   HEART: S1/S2 present. RRR.   ABD/ GI: Soft, non-tender, non-distended. Bowel sounds present  EXT: NC/NC/NE/2+PP/CONTE  NEURO: AAOX3

## 2022-10-21 NOTE — DISCHARGE NOTE NURSING/CASE MANAGEMENT/SOCIAL WORK - PATIENT PORTAL LINK FT
You can access the FollowMyHealth Patient Portal offered by [facility] by registering at the following website: [email or web address]. By joining [employer]’s FollowMyHealth portal, you will also be able to view your health information using other applications (apps) compatible with our system.

## 2022-10-21 NOTE — CONSULT NOTE ADULT - SUBJECTIVE AND OBJECTIVE BOX
CC:HPI: HPI:  62-year-old female with past medical history of hypothyroidism, presents with headache, dizziness, and syncope.  Patient had a syncopal episode today, witnessed by , lasted for about 1 minute.  Patient had no convulsions or loss of bladder or bowel function.  As per  patient has been complaining of headache and decided to jump to bed and when he came to rub her head she rested her head on his chest and had LOC for about 1 minute.  Patient admits she was seen at [facility] ED a few days ago for headache and lightheadedness.  Patient had labs done and was prescribed meclizine.  As per family meclizine helped a bit but patient continued to have headache and felt lightheaded.  Patient denies any chest pain or shortness of breath.  Patient denies any focal numbness, weakness, facial droop, slurred speech, or vision changes.  Patient just says she feels tired now.  Patient denies nausea vomiting diarrhea or abdominal pain.  Never had a stress test or seen cardiology.    In ED, she was HD stable. labs showed no major abnormalities. CT head negative. TTE G1 diastolic dysfx. Trop x2 negative. CTA lung no PE. Patient admitted to Telemetry floor (20 Oct 2022 19:44)      ROS:  Constitutional, Neurological, Psychiatric, Eyes, ENT, Cardiovascular, Respiratory, Gastrointestinal, Genitourinary, Musculoskeletal, Integumentary, Endocrine and Heme/Lymph are otherwise negative.     Physical Exam:  Constitutional: alert and in no acute distress.  Eyes: the sclera and conjunctiva were normal, pupils were equal in size, round, reactive to light, with normal accommodation and extraocular movements were intact.   Back: no costovertebral angle tenderness and no spinal tenderness.      Neuro Exam:  Orientation: oriented to person, oriented to place and oriented to time.   Attention: normal concentrating ability and visual attention was not decreased.   Language: no difficulty naming common objects, no difficulty repeating a phrase, no difficulty writing a sentence, fluency intact, comprehension intact and reading intact.   Fund of knowledge: displays adequate knowledge of personal past history.   Cranial Nerves: visual acuity intact bilaterally, visual fields full to confrontation, pupils equal round and reactive to light, extraocular motion intact, facial sensation intact symmetrically, face symmetrical, hearing was intact bilaterally, tongue and palate midline, head turning and shoulder shrug symmetric and there was no tongue deviation with protrusion.   Motor: muscle tone was normal in all four extremities, muscle strength was normal in all four extremities and normal bulk in all four extremities.   Sensory exam: light touch was intact.   Coordination:. normal gait. balance was intact. there was no past-pointing. no tremor present.   Deep tendon reflexes:   Biceps right 2+. Biceps left 2+.    Triceps right 2+. Triceps left 2+.  LOC  Brachioradialis right 2+. Brachioradialis left 2+.    Patella right 2+. Patella left 2+.    Ankle jerk right 2+. Ankle jerk left 2+.   Plantar responses normal on the right, normal on the left.          Allergies    No Known Allergies    Intolerances      MEDICATIONS  (STANDING):  Enoxaparin Injectable 40 milliGRAM(s) SubCutaneous every 24 hours  Levothyroxine  Oral Tab/Cap - Peds 25 MICROGram(s) Oral daily      MEDICATIONS  (PRN):  Meclizine 25 milliGRAM(s) Oral three times a day PRN Dizziness      LABS:                        13.2   4.83  )-----------( 218      ( 19 Oct 2022 10:55 )             39.6     10-19    136  |  100  |  12  ----------------------------<  98  4.1   |  28  |  0.6<L>    Ca    9.5      19 Oct 2022 10:55  Mg     2.1     10-19    TPro  6.6  /  Alb  4.4  /  TBili  0.3  /  DBili  x   /  AST  18  /  ALT  14  /  AlkPhos  54  10-19      COVID-19 PCR: NotDetec      Neuro Imaging:  < from: CT Head No Cont (10.19.22 @ 11:11) >  FINDINGS:    The ventricles and sulci are unremarkable in appearance.     There is no intraparenchymal hematoma, mass effect or midline shift. No   abnormal extra-axial fluid collections are present.    The calvarium is intact. The visualized intraorbital compartments,   paranasal sinuses and mastoid complexes appear free of acute disease.    IMPRESSION:  No acute intracranial pathology. No evidence of midline shift, mass   effect or intracranial hemorrhage.    --- End of Report ---            LENNY PIEDRA MD; Attending Radiologist  This document has been electronically signed. Oct 19 2022 11:24AM    < end of copied text >    EEG:     Echo:   Carotid Doppler: N/A  Telemetry:              CC: Syncope      HPI:  62-yo F with pMHX of hypothyroidism, p/w headache, dizziness, and syncopal episode. Patient states that as of this past Sunday she was in bed when she acutely became lightheaded, diaphoretic and had a near syncopal episode. Later that day she went about her routine activity without any more episodes. In the following days she experienced occasional lightheadedness especially when she turned left. On the day of presentation patient had a syncopal episode, witnessed by , lasted for about 1 minute.  Patient had no convulsions or loss of bladder or bowel function or a post ictal state.  Patient was seen at [facility] ED a few days ago for headache and lightheadedness and was prescribed meclizine.  As per family meclizine helped a bit but patient continued to have headache and felt lightheaded.     Home Medications:  Levothyroxine: orally once a day (16 Oct 2022 13:28)    Social History  Denies smoking alcohol or drug use    Family History  Mother with HTN  Brother with seizure and migraines    Neuro Exam:  Orientation: patient is awake alert oriented. Following commands  Fund of knowledge: able to give history   Cranial Nerves: EOMI, VFF, PERRLA, Visual acuity intact, speech fluent, able to name and repeat words and sentences, tongue midline, normal shoulder shrug.  Motor: 5/5 throughout/ no drift  Sensory exam: intact.   Coordination:. no dysmetria or limb ataxia  Gait: deferred          Allergies    No Known Allergies    Intolerances      MEDICATIONS  (STANDING):  Enoxaparin Injectable 40 milliGRAM(s) SubCutaneous every 24 hours  Levothyroxine  Oral Tab/Cap - Peds 25 MICROGram(s) Oral daily      MEDICATIONS  (PRN):  Meclizine 25 milliGRAM(s) Oral three times a day PRN Dizziness      LABS:                        13.2   4.83  )-----------( 218      ( 19 Oct 2022 10:55 )             39.6     10-19    136  |  100  |  12  ----------------------------<  98  4.1   |  28  |  0.6<L>    Ca    9.5      19 Oct 2022 10:55  Mg     2.1     10-19    TPro  6.6  /  Alb  4.4  /  TBili  0.3  /  DBili  x   /  AST  18  /  ALT  14  /  AlkPhos  54  10-19      COVID-19 PCR: NotDetec      Neuro Imaging:  < from: CT Head No Cont (10.19.22 @ 11:11) >  FINDINGS:    The ventricles and sulci are unremarkable in appearance.     There is no intraparenchymal hematoma, mass effect or midline shift. No   abnormal extra-axial fluid collections are present.    The calvarium is intact. The visualized intraorbital compartments,   paranasal sinuses and mastoid complexes appear free of acute disease.    IMPRESSION:  No acute intracranial pathology. No evidence of midline shift, mass   effect or intracranial hemorrhage.    --- End of Report ---            LENNY PIEDRA MD; Attending Radiologist  This document has been electronically signed. Oct 19 2022 11:24AM    < end of copied text >    EEG:     Echo:   Carotid Doppler: N/A  Telemetry:

## 2022-10-21 NOTE — DISCHARGE NOTE PROVIDER - NSDCFUADDINST_GEN_ALL_CORE_FT
Patient is taking alot of supplements from home and it was emphasized that she needs to stop it.  Please avoid the use of over the counter sleep medications with out your doctor prescription.  SH is mildly elevated-- T4 normal-- should repeat test in 4 weeks.

## 2022-10-21 NOTE — DISCHARGE NOTE PROVIDER - CARE PROVIDER_API CALL
Shyam Bertrand (DO)  Internal Medicine  [address] [place] [address]  Phone: [phone]  Fax: [phone]  Follow Up Time: 2 weeks

## 2022-10-21 NOTE — CONSULT NOTE ADULT - NS ATTEND AMEND GEN_ALL_CORE FT
I have personally seen and examined this patient.  I have fully participated in the care of this patient.  I have reviewed all pertinent clinical information, including history, physical exam, plan and note. 62-yo F with pMHX of hypothyroidism, p/w headache, positional dizziness (imbalance), near syncope and a syncopal episode. No clear evidence of seizure, REEG was  normal. CT head was unremarkable. Exam showed intact motor and sensory. Has symmetric hyperreflexia. Recommend outpatient Brain and C spine MRI after discharge. Agree to stop all the vitamins.    I have reviewed all pertinent clinical information and reviewed all relevant imaging and diagnostic studies personally.  Recommendations as above.  Agree with above assessment except as noted.

## 2022-10-21 NOTE — DISCHARGE NOTE PROVIDER - HOSPITAL COURSE
62-year-old female with past medical history of hypothyroidism, presents with headache, dizziness, and syncope.  Patient had a syncopal episode today, witnessed by , lasted for about 1 minute.  Patient had no convulsions or loss of bladder or bowel function.  As per  patient has been complaining of headache and decided to jump to bed and when he came to rub her head she rested her head on his chest and had LOC for about 1 minute.  Patient admits she was seen at [facility] ED a few days ago for headache and lightheadedness.  Patient had labs done and was prescribed meclizine.  As per family meclizine helped a bit but patient continued to have headache and felt lightheaded.  Patient denies any chest pain or shortness of breath.  Patient denies any focal numbness, weakness, facial droop, slurred speech, or vision changes.  Patient just says she feels tired now.  Patient denies nausea vomiting diarrhea or abdominal pain.  Never had a stress test or seen cardiology.  In ED, she was HD stable. labs showed no major abnormalities. CT head negative. TTE G1 diastolic dysfx. Trop x2 negative. CTA lung no PE. Patient admitted to Telemetry floor  Syncopal Episode Etiologies Include neuro mediated, orthostatic or cardiovascular  > Hemodynamically Stable , CTA neg for PE, CT no evidence of Acute intracranial Pathology   > Less likely to be  Seizure Episode --check EEG-- Normal   > ECG, NSR no Ischemic Changes  noted   Hx Hypothyroidism --TSH is mildly elevated-- T4 normal-- should repeat test in 4 weeks.

## 2022-10-21 NOTE — CONSULT NOTE ADULT - ASSESSMENT
62-yo F with pMHX of hypothyroidism, p/w headache, positional dizziness (imbalance), near syncope and a syncopal episode. Episode was 1 min duration with no convulsions, loss of b/b function or a post ictal state.  Patient was seen at [facility] ED a few days ago for headache and lightheadedness and was prescribed meclizine.  As per family meclizine helped a bit but patient continued to have headache and felt lightheaded. CTH negative for acute findings     #Syncope R/O Seizure and vertebrobasilar insufficiency    Plan  REEG  obtain cta h/n  Continue cardiac work up for syncope  Neuroattending note will follow   62-yo F with pMHX of hypothyroidism, p/w headache, positional dizziness (imbalance), near syncope and a syncopal episode. Episode was 1 min duration with no convulsions, loss of b/b function or a post ictal state.  Patient was seen at [facility] ED a few days ago for headache and lightheadedness and was prescribed meclizine.  As per family meclizine helped a bit but patient continued to have headache and felt lightheaded. CTH negative for acute findings     #Syncope R/O Seizure and vertebrobasilar insufficiency    Plan  REEG: normal   Continue cardiac work up for syncope  No further inpatient work up   Could follow up with neurology after discharge for Brain and C spine MRI

## 2022-10-21 NOTE — DISCHARGE NOTE NURSING/CASE MANAGEMENT/SOCIAL WORK - NSDCPEFALRISK_GEN_ALL_CORE
For information on Fall & Injury Prevention, visit: [email or web address] OR  [email or web address] OR  https://www.cdc.gov/steadi/patient.html

## 2022-10-27 DIAGNOSIS — T50.911A POISONING BY MULTIPLE UNSPECIFIED DRUGS, MEDICAMENTS AND BIOLOGICAL SUBSTANCES, ACCIDENTAL (UNINTENTIONAL), INITIAL ENCOUNTER: ICD-10-CM

## 2022-10-27 DIAGNOSIS — E03.9 HYPOTHYROIDISM, UNSPECIFIED: ICD-10-CM

## 2022-10-27 DIAGNOSIS — Z87.891 PERSONAL HISTORY OF NICOTINE DEPENDENCE: ICD-10-CM

## 2022-10-27 DIAGNOSIS — Z20.822 CONTACT WITH AND (SUSPECTED) EXPOSURE TO COVID-19: ICD-10-CM

## 2022-10-27 DIAGNOSIS — R55 SYNCOPE AND COLLAPSE: ICD-10-CM

## 2022-10-27 DIAGNOSIS — R00.0 TACHYCARDIA, UNSPECIFIED: ICD-10-CM

## 2023-04-17 ENCOUNTER — NON-APPOINTMENT (OUTPATIENT)
Age: 63
End: 2023-04-17

## 2023-04-17 ENCOUNTER — APPOINTMENT (OUTPATIENT)
Dept: OBGYN | Facility: CLINIC | Age: 63
End: 2023-04-17
Payer: COMMERCIAL

## 2023-04-17 VITALS
DIASTOLIC BLOOD PRESSURE: 70 MMHG | HEIGHT: 67 IN | WEIGHT: 126 LBS | SYSTOLIC BLOOD PRESSURE: 120 MMHG | BODY MASS INDEX: 19.78 KG/M2

## 2023-04-17 DIAGNOSIS — Z86.39 PERSONAL HISTORY OF OTHER ENDOCRINE, NUTRITIONAL AND METABOLIC DISEASE: ICD-10-CM

## 2023-04-17 DIAGNOSIS — Z01.419 ENCOUNTER FOR GYNECOLOGICAL EXAMINATION (GENERAL) (ROUTINE) W/OUT ABNORMAL FINDINGS: ICD-10-CM

## 2023-04-17 DIAGNOSIS — Z80.0 FAMILY HISTORY OF MALIGNANT NEOPLASM OF DIGESTIVE ORGANS: ICD-10-CM

## 2023-04-17 DIAGNOSIS — Z80.7 FAMILY HISTORY OF OTHER MALIGNANT NEOPLASMS OF LYMPHOID, HEMATOPOIETIC AND RELATED TISSUES: ICD-10-CM

## 2023-04-17 PROCEDURE — 76830 TRANSVAGINAL US NON-OB: CPT

## 2023-04-17 PROCEDURE — 99396 PREV VISIT EST AGE 40-64: CPT | Mod: 25

## 2023-04-17 RX ORDER — ESTRADIOL 0.1 MG/G
0.1 CREAM VAGINAL
Qty: 1 | Refills: 3 | Status: ACTIVE | COMMUNITY
Start: 2023-04-17 | End: 1900-01-01

## 2023-04-17 NOTE — HISTORY OF PRESENT ILLNESS
[FreeTextEntry1] : 61 yo for [employer]. Last seen for [employer] in 2020. LMP in 50's, denies any PMB. Sexually active, reports pain, dryness, burning. H/o OAB, declined treatment. Reports nocturia 2x nightly. \par obhx: c/s x 2\par Last mammogram: many years ago, normal per patient\par Never had a colonoscopy\par H/o DEXA scan 4 years ago w/ osteopenia. \par Reports some pelvic pulling pain on left side.

## 2023-04-17 NOTE — PROCEDURE
[Pelvic Pain] : pelvic pain [Transvaginal Ultrasound] : transvaginal ultrasound [Retroverted] : retroverted [No Fibroid(s)] : no fibroid(s) [L: ___ cm] : L: [unfilled] cm [W: ___cm] : W: [unfilled] cm [H: ___ cm] : H: [unfilled] cm [FreeTextEntry5] : ES: 0.09cm [FreeTextEntry7] : 1.55 x 0.82 cm [FreeTextEntry8] : 1.71 x 0.79 cm [FreeTextEntry6] : cervix: 2.02 cm [FreeTextEntry4] : normal TVUS

## 2023-04-17 NOTE — PHYSICAL EXAM
[Appropriately responsive] : appropriately responsive [Soft] : soft [Non-tender] : non-tender [Non-distended] : non-distended [No Lesions] : no lesions [No Mass] : no mass [Examination Of The Breasts] : a normal appearance [] : implants [No Masses] : no breast masses were palpable [Labia Majora] : normal [Labia Minora] : normal [No Bleeding] : There was no active vaginal bleeding [Normal] : normal [Uterine Adnexae] : normal

## 2023-04-17 NOTE — DISCUSSION/SUMMARY
[FreeTextEntry1] : 63 yo for [employer], [employer]\par - rx given vaginal estrogen\par -f/u pap and HPV\par -referral given for mammogram and DEXA scan\par -referral given for GI for colonsocopy

## 2023-04-19 LAB
CYTOLOGY CVX/VAG DOC THIN PREP: ABNORMAL
HPV HIGH+LOW RISK DNA PNL CVX: NOT DETECTED

## 2024-05-20 ENCOUNTER — APPOINTMENT (OUTPATIENT)
Dept: OBGYN | Facility: CLINIC | Age: 64
End: 2024-05-20
Payer: COMMERCIAL

## 2024-05-20 VITALS
SYSTOLIC BLOOD PRESSURE: 107 MMHG | HEIGHT: 67 IN | DIASTOLIC BLOOD PRESSURE: 62 MMHG | WEIGHT: 125 LBS | BODY MASS INDEX: 19.62 KG/M2

## 2024-05-20 DIAGNOSIS — N63.10 UNSPECIFIED LUMP IN THE RIGHT BREAST, UNSPECIFIED QUADRANT: ICD-10-CM

## 2024-05-20 DIAGNOSIS — R10.2 PELVIC AND PERINEAL PAIN: ICD-10-CM

## 2024-05-20 PROCEDURE — 99396 PREV VISIT EST AGE 40-64: CPT | Mod: 25

## 2024-05-20 PROCEDURE — 76830 TRANSVAGINAL US NON-OB: CPT

## 2024-05-20 NOTE — DISCUSSION/SUMMARY
[FreeTextEntry1] : 62 yo for [employer] - referral given for imaging right breast mass - referral given for pelvic sono for right simple cyst and non visualization of left ovary -f/u pap and HPV

## 2024-05-20 NOTE — PROCEDURE
[Pelvic Pain] : pelvic pain [Transvaginal Ultrasound] : transvaginal ultrasound [Retroverted] : retroverted [No Fibroid(s)] : no fibroid(s) [L: ___ cm] : L: [unfilled] cm [W: ___cm] : W: [unfilled] cm [FreeTextEntry5] : ES: 0.19 cm [FreeTextEntry7] : 1.53 x 1.24 cm w/ simple cyst [FreeTextEntry8] : NV [FreeTextEntry6] : cervix: 2.62 cm

## 2024-05-20 NOTE — HISTORY OF PRESENT ILLNESS
[FreeTextEntry1] : 62 yo presents for [employer]. Reports LLQ pain that occurs randomly, sharp 7/10, lasting 3-4 seconds, denies association with movement, PO intake or bowel movement/urination.  Denies any PMB. She is using vaginal estrogen and noticed a benefit, Reports DEXA scan w/ PCP showed osteoporosis in vertebra, unsure of hip/femur.  Last mammogram 6 months ago, normal per patient.

## 2024-05-20 NOTE — PHYSICAL EXAM
[Chaperone Present] : A chaperone was present in the examining room during all aspects of the physical examination [[id number]] : A chaperone was present during the pelvic exam. [FreeTextEntry2] : oniel [Appropriately responsive] : appropriately responsive [Examination Of The Breasts] : a normal appearance [] : implants [___cm] : a ~M [unfilled] ~Ucm superior medial quadrant mass was palpated [Breast Mass Left Breast ___cm] : no mass was palpable [Labia Majora] : normal [Labia Minora] : normal [No Bleeding] : There was no active vaginal bleeding [Normal] : normal [Uterine Adnexae] : normal

## 2024-05-22 LAB
CYTOLOGY CVX/VAG DOC THIN PREP: ABNORMAL
HPV HIGH+LOW RISK DNA PNL CVX: NOT DETECTED

## 2024-06-04 RX ORDER — ESTRADIOL 0.1 MG/G
0.1 CREAM VAGINAL
Qty: 1 | Refills: 2 | Status: ACTIVE | COMMUNITY
Start: 2024-06-04 | End: 1900-01-01

## 2024-06-27 ENCOUNTER — APPOINTMENT (OUTPATIENT)
Dept: OBGYN | Facility: CLINIC | Age: 64
End: 2024-06-27

## 2024-09-04 RX ORDER — ESTRADIOL 0.1 MG/G
0.1 CREAM VAGINAL
Qty: 1 | Refills: 2 | Status: ACTIVE | COMMUNITY
Start: 2024-09-03 | End: 1900-01-01

## 2024-12-22 ENCOUNTER — NON-APPOINTMENT (OUTPATIENT)
Age: 64
End: 2024-12-22